# Patient Record
Sex: MALE | Race: BLACK OR AFRICAN AMERICAN | Employment: UNEMPLOYED | URBAN - METROPOLITAN AREA
[De-identification: names, ages, dates, MRNs, and addresses within clinical notes are randomized per-mention and may not be internally consistent; named-entity substitution may affect disease eponyms.]

---

## 2017-07-26 ENCOUNTER — HOSPITAL ENCOUNTER (EMERGENCY)
Age: 37
Discharge: HOME | End: 2017-07-26
Attending: EMERGENCY MEDICINE
Payer: COMMERCIAL

## 2017-07-26 ENCOUNTER — APPOINTMENT (OUTPATIENT)
Dept: GENERAL RADIOLOGY | Age: 37
End: 2017-07-26
Payer: COMMERCIAL

## 2017-07-26 ENCOUNTER — APPOINTMENT (OUTPATIENT)
Dept: CT IMAGING | Age: 37
End: 2017-07-26
Payer: COMMERCIAL

## 2017-07-26 DIAGNOSIS — R45.851 SUICIDAL INTENT: ICD-10-CM

## 2017-07-26 DIAGNOSIS — S11.91XA LACERATION OF NECK, INITIAL ENCOUNTER: Primary | ICD-10-CM

## 2017-07-26 LAB
ABO/RH: NORMAL
ANION GAP SERPL CALCULATED.3IONS-SCNC: 14 MMOL/L (ref 9–17)
ANTIBODY SCREEN: NEGATIVE
ARM BAND NUMBER: NORMAL
BLOOD BANK SPECIMEN: ABNORMAL
BUN BLDV-MCNC: 11 MG/DL (ref 6–20)
CHLORIDE BLD-SCNC: 101 MMOL/L (ref 98–107)
CO2: 26 MMOL/L (ref 20–31)
CREAT SERPL-MCNC: 0.77 MG/DL (ref 0.7–1.2)
ETHANOL PERCENT: <0.01 %
ETHANOL: <10 MG/DL
EXPIRATION DATE: NORMAL
GFR AFRICAN AMERICAN: >60 ML/MIN
GFR NON-AFRICAN AMERICAN: >60 ML/MIN
GFR SERPL CREATININE-BSD FRML MDRD: ABNORMAL ML/MIN/{1.73_M2}
GFR SERPL CREATININE-BSD FRML MDRD: ABNORMAL ML/MIN/{1.73_M2}
GLUCOSE BLD-MCNC: 93 MG/DL (ref 70–99)
HCT VFR BLD CALC: 41.3 % (ref 41–53)
HEMOGLOBIN: 13.9 G/DL (ref 13.5–17.5)
MCH RBC QN AUTO: 32.5 PG (ref 26–34)
MCHC RBC AUTO-ENTMCNC: 33.6 G/DL (ref 31–37)
MCV RBC AUTO: 96.9 FL (ref 80–100)
PDW BLD-RTO: 12.9 % (ref 12.5–15.4)
PLATELET # BLD: 169 K/UL (ref 140–450)
PMV BLD AUTO: 8.7 FL (ref 6–12)
POTASSIUM SERPL-SCNC: 4.4 MMOL/L (ref 3.7–5.3)
RBC # BLD: 4.27 M/UL (ref 4.5–5.9)
SODIUM BLD-SCNC: 141 MMOL/L (ref 135–144)
WBC # BLD: 5.1 K/UL (ref 3.5–11)

## 2017-07-26 PROCEDURE — 99291 CRITICAL CARE FIRST HOUR: CPT

## 2017-07-26 PROCEDURE — 82947 ASSAY GLUCOSE BLOOD QUANT: CPT

## 2017-07-26 PROCEDURE — 80051 ELECTROLYTE PANEL: CPT

## 2017-07-26 PROCEDURE — 85027 COMPLETE CBC AUTOMATED: CPT

## 2017-07-26 PROCEDURE — 86900 BLOOD TYPING SEROLOGIC ABO: CPT

## 2017-07-26 PROCEDURE — 70498 CT ANGIOGRAPHY NECK: CPT

## 2017-07-26 PROCEDURE — 86850 RBC ANTIBODY SCREEN: CPT

## 2017-07-26 PROCEDURE — G0480 DRUG TEST DEF 1-7 CLASSES: HCPCS

## 2017-07-26 PROCEDURE — G0390 TRAUMA RESPONS W/HOSP CRITI: HCPCS

## 2017-07-26 PROCEDURE — 86901 BLOOD TYPING SEROLOGIC RH(D): CPT

## 2017-07-26 PROCEDURE — 12001 RPR S/N/AX/GEN/TRNK 2.5CM/<: CPT

## 2017-07-26 PROCEDURE — 6360000004 HC RX CONTRAST MEDICATION: Performed by: SURGERY

## 2017-07-26 PROCEDURE — 82565 ASSAY OF CREATININE: CPT

## 2017-07-26 PROCEDURE — 6360000002 HC RX W HCPCS

## 2017-07-26 PROCEDURE — 70360 X-RAY EXAM OF NECK: CPT

## 2017-07-26 PROCEDURE — 84520 ASSAY OF UREA NITROGEN: CPT

## 2017-07-26 PROCEDURE — 99285 EMERGENCY DEPT VISIT HI MDM: CPT

## 2017-07-26 PROCEDURE — 90471 IMMUNIZATION ADMIN: CPT

## 2017-07-26 PROCEDURE — 2500000003 HC RX 250 WO HCPCS

## 2017-07-26 PROCEDURE — 90715 TDAP VACCINE 7 YRS/> IM: CPT

## 2017-07-26 RX ORDER — LIDOCAINE HYDROCHLORIDE AND EPINEPHRINE 10; 10 MG/ML; UG/ML
INJECTION, SOLUTION INFILTRATION; PERINEURAL
Status: COMPLETED
Start: 2017-07-26 | End: 2017-07-26

## 2017-07-26 RX ORDER — CEPHALEXIN 500 MG/1
500 CAPSULE ORAL 4 TIMES DAILY
Qty: 28 CAPSULE | Refills: 0 | Status: SHIPPED | OUTPATIENT
Start: 2017-07-26 | End: 2017-08-02

## 2017-07-26 RX ADMIN — IOVERSOL 90 ML: 741 INJECTION INTRA-ARTERIAL; INTRAVENOUS at 03:58

## 2017-07-26 RX ADMIN — TETANUS TOXOID, REDUCED DIPHTHERIA TOXOID AND ACELLULAR PERTUSSIS VACCINE, ADSORBED 0.5 ML: 5; 2.5; 8; 8; 2.5 SUSPENSION INTRAMUSCULAR at 04:18

## 2017-07-26 RX ADMIN — LIDOCAINE HYDROCHLORIDE,EPINEPHRINE BITARTRATE 20 ML: 10; .01 INJECTION, SOLUTION INFILTRATION; PERINEURAL at 04:17

## 2017-07-26 RX ADMIN — CEFAZOLIN SODIUM 2 G: 1 INJECTION, SOLUTION INTRAVENOUS at 03:16

## 2017-07-26 ASSESSMENT — ENCOUNTER SYMPTOMS
CHEST TIGHTNESS: 0
GASTROINTESTINAL NEGATIVE: 1
COUGH: 0
ABDOMINAL PAIN: 0
DIARRHEA: 0
RESPIRATORY NEGATIVE: 1
CONSTIPATION: 0
ALLERGIC/IMMUNOLOGIC NEGATIVE: 1
BACK PAIN: 0
NAUSEA: 0
EYES NEGATIVE: 1
WHEEZING: 0
SORE THROAT: 0
TROUBLE SWALLOWING: 0
VOMITING: 0
SHORTNESS OF BREATH: 0
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
## 2017-07-26 NOTE — PROCEDURES
Trauma, Emergency and Critical Surgical Services                    PROCEDURE NOTE - LACERATION CLOSURE    PATIENT NAME: Yan Zamudio RECORD NO. [id number]  DATE: 7/26/2017  SURGEON:   Krystle Machado  PRIMARY CARE PHYSICIAN: No primary care provider on file. PREOPERATIVE DIAGNOSIS: Laceration(s) as follows:   LOCATION: Left-sided neck   LENGTH: 3x1cm   LAYERED CLOSURE: No    POSTOPERATIVE DIAGNOSIS:  Same  PROCEDURE PERFORMED:  Suture closure of laceration  SURGEON:   Les Graham  ANESTHESIA:  Local utilizing  Lidocaine 1% without epinephrine  ESTIMATED BLOOD LOSS:  Less than 25 ml. COMPLICATIONS:  None immediately appreciated. OPERATIVE NOTE PREPARED BY: Fariba Paulino DO     DISCUSSION:  Caitlin Alfaro is a 31y.o.-year-old male. The history and physical examination were reviewed and confirmed. The diagnoses, proposed procedure, risks, possible complications, benefits and alternatives were discussed with the patient or family. He was given the opportunity to ask questions, and once answered, informed consent was obtained. The patient was then prepared for the procedure. PROCEDURE:  A timeout was initiated and the procedure and patient were confirmed by those present. The wound area was irrigated with sterile saline, cleansed with povidone iodine scrub and draped in a sterile fashion. The wound area was anesthetized with Lidocaine 1% without epinephrine without added sodium bicarbonate. The wound was repaired with 3-0 Nylon; 1 sutures were used. steri strips were use to approxite the wound edge    No immediate complication was evident. All sponge, instrument and needle counts were correct at the completion of the procedure.        Fariba Paulino DO  7/26/2017, 4:30 AM

## 2017-07-26 NOTE — H&P
available due to exam limitations documented above  Prior to Admission medications    Not on File       ALLERGIES:   []  None    []   Information not available due to exam limitations documented above   Review of patient's allergies indicates not on file. PAST MEDICAL HISTORY: []  None   []   Information not available due to exam limitations documented above    has no past medical history on file. has no past surgical history on file. FAMILY HISTORY   []   Information not available due to exam limitations documented above    family history is not on file. SOCIAL HISTORY  []   Information not available due to exam limitations documented above     has no tobacco history on file. has no alcohol history on file. has no drug history on file. PERTINENT SYSTEMIC REVIEW:  []   Information not available due to exam limitations documented above  Pertinent items are noted in HPI. PHYSICAL EXAMINATION:   GLASCOW COMA SCALE  NEUROMUSCULAR BLOCKADE PRIOR TO ARRIVAL     [x]No        []Yes      Variable  Score   Variable  Score  Eye opening [x]Spontaneous 4 Verbal  [x]Oriented  5     []To voice  3   []Confused  4    []To pain  2   []Inapp words  3    []None  1   []Incomp words 2       []None  1   Motor   [x]Obeys  6    []Localizes pain 5    []Withdraws(pain) 4    []Flexion(pain) 3  []Extension(pain) 2    []None  1     GCS Total = 15    PHYSICAL EXAMINATION  VITAL SIGNS: There were no vitals filed for this visit.     General Appearance: alert and oriented  in no acute distress  Skin: warm and dry, no rash or erythema  Head: normocephalic and atraumatic  Eyes: extraocular eye movements intact, conjunctivae normal  ENT:  external ear and ear canal normal bilaterally, nose without deformity,   Neck: 2x1 cm vertical incision to left neck, bleeding controlled, not penetrating through the platysma  Pulmonary/Chest: no acute respiratory distress, equal chest rise  Cardiovascular: normal rate, normal S1 and S2,  distal pulses intact,  Abdomen: soft, non-tender, non-distended,  Extremities: no cyanosis, clubbing or edema  Pelvis: stable  Musculoskeletal: moving all extremities no joint swelling, deformity or tenderness  Neurologic: grossly normal and speech normal      RADIOLOGY  PLAIN FILMS  Ordered Findings  []CHEST []Normal   [] Preliminary findings  []PELVIS  []Normal   [] Preliminary findings   EXTREMITIES      []RUE []Normal   _____________________    []LUE  []Normal   _____________________    []RLE []Normal   _____________________    []LLE  []Normal   _____________________  [x]OTHER []Normal   __neck soft tissue___________________    CT SCANS  Ordered  []HEAD  []Normal   [] Preliminary findings  []CHEST  []Normal   [] Preliminary findings   []ABD/PELVIS[]Normal  [] Preliminary findings  []FACE []Normal   [] Preliminary findings:   []C-SPINE  []Normal   [] Preliminary findings   []T-SPINE  []Normal   [] Preliminary findings   []L-SPINE  []Normal   [] Preliminary findings     [x]ANGIOGRAPHY  []Normal     [] Preliminary findings   []OTHER []Normal     [] Preliminary findings:     LABS  Labs Reviewed - No data to display        Mago BoyleDO  7/26/17, 3:43 AM

## 2019-08-09 ENCOUNTER — HOSPITAL ENCOUNTER (EMERGENCY)
Age: 39
Discharge: HOME | End: 2019-08-09
Attending: EMERGENCY MEDICINE

## 2019-08-09 VITALS
SYSTOLIC BLOOD PRESSURE: 124 MMHG | HEIGHT: 71 IN | DIASTOLIC BLOOD PRESSURE: 75 MMHG | TEMPERATURE: 98.4 F | RESPIRATION RATE: 15 BRPM | HEART RATE: 51 BPM | BODY MASS INDEX: 27.02 KG/M2 | OXYGEN SATURATION: 98 % | WEIGHT: 193 LBS

## 2019-08-09 DIAGNOSIS — T50.902A INTENTIONAL DRUG OVERDOSE, INITIAL ENCOUNTER (HCC): ICD-10-CM

## 2019-08-09 DIAGNOSIS — S11.91XA LACERATION OF NECK, INITIAL ENCOUNTER: Primary | ICD-10-CM

## 2019-08-09 PROCEDURE — 99282 EMERGENCY DEPT VISIT SF MDM: CPT

## 2019-08-09 PROCEDURE — 6370000000 HC RX 637 (ALT 250 FOR IP): Performed by: STUDENT IN AN ORGANIZED HEALTH CARE EDUCATION/TRAINING PROGRAM

## 2019-08-09 PROCEDURE — 12002 RPR S/N/AX/GEN/TRNK2.6-7.5CM: CPT

## 2019-08-09 RX ORDER — CEPHALEXIN 500 MG/1
500 CAPSULE ORAL 4 TIMES DAILY
Qty: 20 CAPSULE | Refills: 0 | Status: SHIPPED | OUTPATIENT
Start: 2019-08-09 | End: 2019-08-14

## 2019-08-09 RX ORDER — BACITRACIN, NEOMYCIN, POLYMYXIN B 400; 3.5; 5 [USP'U]/G; MG/G; [USP'U]/G
OINTMENT TOPICAL
Qty: 1 TUBE | Refills: 0 | Status: SHIPPED | OUTPATIENT
Start: 2019-08-09 | End: 2019-08-19

## 2019-08-09 RX ORDER — CEPHALEXIN 250 MG/1
500 CAPSULE ORAL ONCE
Status: COMPLETED | OUTPATIENT
Start: 2019-08-09 | End: 2019-08-09

## 2019-08-09 RX ADMIN — CEPHALEXIN 500 MG: 250 CAPSULE ORAL at 21:12

## 2019-08-09 NOTE — ED NOTES
Pt placed on cardiac monitor, BP cuff, and pulse ox. Alarms set.       Miah Rivas, LEODAN  08/09/19 8916
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
## 2019-08-10 ASSESSMENT — ENCOUNTER SYMPTOMS
CONSTIPATION: 0
SHORTNESS OF BREATH: 0
DIARRHEA: 0
COUGH: 0
ABDOMINAL PAIN: 0
RHINORRHEA: 0
VOMITING: 0
BACK PAIN: 0
NAUSEA: 0
TROUBLE SWALLOWING: 0
SORE THROAT: 0
STRIDOR: 0

## 2019-11-13 ENCOUNTER — HOSPITAL ENCOUNTER (EMERGENCY)
Age: 39
Discharge: HOME | End: 2019-11-13
Attending: EMERGENCY MEDICINE
Payer: COMMERCIAL

## 2019-11-13 ENCOUNTER — APPOINTMENT (OUTPATIENT)
Dept: GENERAL RADIOLOGY | Age: 39
End: 2019-11-13
Payer: COMMERCIAL

## 2019-11-13 VITALS
OXYGEN SATURATION: 100 % | WEIGHT: 180 LBS | RESPIRATION RATE: 18 BRPM | HEIGHT: 70 IN | BODY MASS INDEX: 25.77 KG/M2 | DIASTOLIC BLOOD PRESSURE: 67 MMHG | SYSTOLIC BLOOD PRESSURE: 107 MMHG | TEMPERATURE: 98.1 F | HEART RATE: 59 BPM

## 2019-11-13 DIAGNOSIS — S63.122A: Primary | ICD-10-CM

## 2019-11-13 PROCEDURE — 6370000000 HC RX 637 (ALT 250 FOR IP): Performed by: EMERGENCY MEDICINE

## 2019-11-13 PROCEDURE — 99283 EMERGENCY DEPT VISIT LOW MDM: CPT

## 2019-11-13 PROCEDURE — 73130 X-RAY EXAM OF HAND: CPT

## 2019-11-13 PROCEDURE — 96372 THER/PROPH/DIAG INJ SC/IM: CPT

## 2019-11-13 PROCEDURE — 6360000002 HC RX W HCPCS: Performed by: EMERGENCY MEDICINE

## 2019-11-13 PROCEDURE — 29130 APPL FINGER SPLINT STATIC: CPT

## 2019-11-13 RX ORDER — MORPHINE SULFATE 4 MG/ML
4 INJECTION, SOLUTION INTRAMUSCULAR; INTRAVENOUS ONCE
Status: COMPLETED | OUTPATIENT
Start: 2019-11-13 | End: 2019-11-13

## 2019-11-13 RX ORDER — IBUPROFEN 800 MG/1
800 TABLET ORAL ONCE
Status: COMPLETED | OUTPATIENT
Start: 2019-11-13 | End: 2019-11-13

## 2019-11-13 RX ORDER — ACETAMINOPHEN 325 MG/1
650 TABLET ORAL EVERY 6 HOURS PRN
Qty: 120 TABLET | Refills: 0 | Status: SHIPPED | OUTPATIENT
Start: 2019-11-13

## 2019-11-13 RX ORDER — IBUPROFEN 800 MG/1
800 TABLET ORAL EVERY 8 HOURS PRN
Qty: 30 TABLET | Refills: 0 | Status: SHIPPED | OUTPATIENT
Start: 2019-11-13

## 2019-11-13 RX ORDER — ACETAMINOPHEN 325 MG/1
650 TABLET ORAL ONCE
Status: COMPLETED | OUTPATIENT
Start: 2019-11-13 | End: 2019-11-13

## 2019-11-13 RX ADMIN — ACETAMINOPHEN 650 MG: 325 TABLET ORAL at 16:48

## 2019-11-13 RX ADMIN — MORPHINE SULFATE 4 MG: 4 INJECTION INTRAVENOUS at 15:34

## 2019-11-13 RX ADMIN — IBUPROFEN 800 MG: 800 TABLET, FILM COATED ORAL at 16:48

## 2019-11-13 ASSESSMENT — ENCOUNTER SYMPTOMS
TROUBLE SWALLOWING: 0
VOMITING: 0
BACK PAIN: 0
SHORTNESS OF BREATH: 0

## 2019-11-13 ASSESSMENT — PAIN DESCRIPTION - PROGRESSION: CLINICAL_PROGRESSION: NOT CHANGED

## 2019-11-13 ASSESSMENT — PAIN DESCRIPTION - FREQUENCY: FREQUENCY: CONTINUOUS

## 2019-11-13 ASSESSMENT — PAIN SCALES - GENERAL
PAINLEVEL_OUTOF10: 6
PAINLEVEL_OUTOF10: 7

## 2019-11-13 ASSESSMENT — PAIN DESCRIPTION - PAIN TYPE: TYPE: ACUTE PAIN

## 2019-11-13 ASSESSMENT — PAIN DESCRIPTION - LOCATION: LOCATION: FINGER (COMMENT WHICH ONE)

## 2019-11-13 ASSESSMENT — PAIN DESCRIPTION - ORIENTATION: ORIENTATION: LEFT

## 2021-04-10 ENCOUNTER — APPOINTMENT (OUTPATIENT)
Dept: CT IMAGING | Age: 41
End: 2021-04-10
Payer: COMMERCIAL

## 2021-04-10 ENCOUNTER — HOSPITAL ENCOUNTER (EMERGENCY)
Age: 41
Discharge: HOME | End: 2021-04-10
Attending: EMERGENCY MEDICINE
Payer: COMMERCIAL

## 2021-04-10 ENCOUNTER — APPOINTMENT (OUTPATIENT)
Dept: GENERAL RADIOLOGY | Age: 41
End: 2021-04-10
Payer: COMMERCIAL

## 2021-04-10 VITALS
BODY MASS INDEX: 26.48 KG/M2 | WEIGHT: 185 LBS | HEIGHT: 70 IN | TEMPERATURE: 98.1 F | DIASTOLIC BLOOD PRESSURE: 82 MMHG | RESPIRATION RATE: 16 BRPM | HEART RATE: 54 BPM | OXYGEN SATURATION: 98 % | SYSTOLIC BLOOD PRESSURE: 125 MMHG

## 2021-04-10 DIAGNOSIS — S11.91XA LACERATION OF NECK, INITIAL ENCOUNTER: ICD-10-CM

## 2021-04-10 DIAGNOSIS — T14.91XA SUICIDE ATTEMPT (HCC): Primary | ICD-10-CM

## 2021-04-10 DIAGNOSIS — U07.1 COVID-19: ICD-10-CM

## 2021-04-10 LAB
ALLEN TEST: ABNORMAL
ANION GAP SERPL CALCULATED.3IONS-SCNC: 11 MMOL/L (ref 9–17)
BLOOD BANK SPECIMEN: ABNORMAL
BLOOD BANK SPECIMEN: NORMAL
BUN BLDV-MCNC: 8 MG/DL (ref 6–20)
CARBOXYHEMOGLOBIN: ABNORMAL %
CHLORIDE BLD-SCNC: 105 MMOL/L (ref 98–107)
CO2: 24 MMOL/L (ref 20–31)
CREAT SERPL-MCNC: 0.67 MG/DL (ref 0.7–1.2)
ETHANOL PERCENT: <0.01 %
ETHANOL: <10 MG/DL
FIO2: ABNORMAL
GFR AFRICAN AMERICAN: >60 ML/MIN
GFR NON-AFRICAN AMERICAN: >60 ML/MIN
GFR SERPL CREATININE-BSD FRML MDRD: ABNORMAL ML/MIN/{1.73_M2}
GFR SERPL CREATININE-BSD FRML MDRD: ABNORMAL ML/MIN/{1.73_M2}
GLUCOSE BLD-MCNC: 83 MG/DL (ref 70–99)
HCG QUALITATIVE: ABNORMAL
HCO3 VENOUS: ABNORMAL MMOL/L (ref 24–30)
HCT VFR BLD CALC: 42.4 % (ref 40.7–50.3)
HEMOGLOBIN: 14.2 G/DL (ref 13–17)
INR BLD: 1.1
MCH RBC QN AUTO: 33.3 PG (ref 25.2–33.5)
MCHC RBC AUTO-ENTMCNC: 33.5 G/DL (ref 28.4–34.8)
MCV RBC AUTO: 99.5 FL (ref 82.6–102.9)
METHEMOGLOBIN: ABNORMAL %
MODE: ABNORMAL
NEGATIVE BASE EXCESS, VEN: ABNORMAL MMOL/L (ref 0–2)
NOTIFICATION TIME: ABNORMAL
NOTIFICATION: ABNORMAL
NRBC AUTOMATED: 0 PER 100 WBC
O2 DEVICE/FLOW/%: ABNORMAL
O2 SAT, VEN: ABNORMAL %
OXYHEMOGLOBIN: ABNORMAL % (ref 95–98)
PARTIAL THROMBOPLASTIN TIME: 23.8 SEC (ref 20.5–30.5)
PATIENT TEMP: ABNORMAL
PCO2, VEN, TEMP ADJ: ABNORMAL MMHG (ref 39–55)
PCO2, VEN: ABNORMAL (ref 39–55)
PDW BLD-RTO: 11.7 % (ref 11.8–14.4)
PEEP/CPAP: ABNORMAL
PH VENOUS: ABNORMAL (ref 7.32–7.42)
PH, VEN, TEMP ADJ: ABNORMAL (ref 7.32–7.42)
PLATELET # BLD: 178 K/UL (ref 138–453)
PMV BLD AUTO: 11 FL (ref 8.1–13.5)
PO2, VEN, TEMP ADJ: ABNORMAL MMHG (ref 30–50)
PO2, VEN: ABNORMAL (ref 30–50)
POSITIVE BASE EXCESS, VEN: ABNORMAL MMOL/L (ref 0–2)
POTASSIUM SERPL-SCNC: 4.5 MMOL/L (ref 3.7–5.3)
PROTHROMBIN TIME: 11.9 SEC (ref 9.1–12.3)
PSV: ABNORMAL
PT. POSITION: ABNORMAL
RBC # BLD: 4.26 M/UL (ref 4.21–5.77)
RESPIRATORY RATE: ABNORMAL
SAMPLE SITE: ABNORMAL
SARS-COV-2, RAPID: DETECTED
SET RATE: ABNORMAL
SODIUM BLD-SCNC: 140 MMOL/L (ref 135–144)
SPECIMEN DESCRIPTION: ABNORMAL
TEXT FOR RESPIRATORY: ABNORMAL
TOTAL HB: ABNORMAL G/DL (ref 12–16)
TOTAL RATE: ABNORMAL
VT: ABNORMAL
WBC # BLD: 3.7 K/UL (ref 3.5–11.3)

## 2021-04-10 PROCEDURE — 82805 BLOOD GASES W/O2 SATURATION: CPT

## 2021-04-10 PROCEDURE — 12042 INTMD RPR N-HF/GENIT2.6-7.5: CPT

## 2021-04-10 PROCEDURE — 85610 PROTHROMBIN TIME: CPT

## 2021-04-10 PROCEDURE — 70498 CT ANGIOGRAPHY NECK: CPT

## 2021-04-10 PROCEDURE — 82947 ASSAY GLUCOSE BLOOD QUANT: CPT

## 2021-04-10 PROCEDURE — 71045 X-RAY EXAM CHEST 1 VIEW: CPT

## 2021-04-10 PROCEDURE — G0480 DRUG TEST DEF 1-7 CLASSES: HCPCS

## 2021-04-10 PROCEDURE — 6360000004 HC RX CONTRAST MEDICATION: Performed by: EMERGENCY MEDICINE

## 2021-04-10 PROCEDURE — 84703 CHORIONIC GONADOTROPIN ASSAY: CPT

## 2021-04-10 PROCEDURE — 85730 THROMBOPLASTIN TIME PARTIAL: CPT

## 2021-04-10 PROCEDURE — 87635 SARS-COV-2 COVID-19 AMP PRB: CPT

## 2021-04-10 PROCEDURE — 84520 ASSAY OF UREA NITROGEN: CPT

## 2021-04-10 PROCEDURE — 80051 ELECTROLYTE PANEL: CPT

## 2021-04-10 PROCEDURE — 85027 COMPLETE CBC AUTOMATED: CPT

## 2021-04-10 PROCEDURE — 99282 EMERGENCY DEPT VISIT SF MDM: CPT

## 2021-04-10 PROCEDURE — 82565 ASSAY OF CREATININE: CPT

## 2021-04-10 RX ORDER — LIDOCAINE HYDROCHLORIDE 10 MG/ML
INJECTION, SOLUTION INFILTRATION; PERINEURAL
Status: DISCONTINUED
Start: 2021-04-10 | End: 2021-04-10 | Stop reason: HOSPADM

## 2021-04-10 RX ORDER — LIDOCAINE HYDROCHLORIDE 10 MG/ML
20 INJECTION, SOLUTION INFILTRATION; PERINEURAL ONCE
Status: DISCONTINUED | OUTPATIENT
Start: 2021-04-10 | End: 2021-04-10 | Stop reason: HOSPADM

## 2021-04-10 RX ADMIN — IOPAMIDOL 90 ML: 755 INJECTION, SOLUTION INTRAVENOUS at 06:28

## 2021-04-10 ASSESSMENT — PAIN SCALES - GENERAL: PAINLEVEL_OUTOF10: 6

## 2021-04-10 ASSESSMENT — PAIN DESCRIPTION - PAIN TYPE: TYPE: ACUTE PAIN

## 2021-04-10 ASSESSMENT — ENCOUNTER SYMPTOMS
NAUSEA: 0
VOMITING: 0
COUGH: 0
ABDOMINAL PAIN: 0
SHORTNESS OF BREATH: 0

## 2021-04-10 ASSESSMENT — PAIN DESCRIPTION - FREQUENCY: FREQUENCY: CONTINUOUS

## 2021-04-10 ASSESSMENT — PAIN DESCRIPTION - ORIENTATION: ORIENTATION: LEFT

## 2021-04-10 NOTE — PROCEDURES
PROCEDURE NOTE - LACERATION CLOSURE    PATIENT NAME: Yan Zamudio RECORD NO. [id number]  DATE: 4/10/2021  TIME OF CLOSURE: 6:50am  SURGEON: Robby  Laceration Repair Performed by: Shreyas Youssef MD    PRIMARY CARE PHYSICIAN: Keit Laureano MD    PREOPERATIVE DIAGNOSIS: Laceration(s) as follows:   LOCATION: Left anterior neck   LENGTH: 4cm   LAYERED CLOSURE: Yes    POSTOPERATIVE DIAGNOSIS:  Same  PROCEDURE PERFORMED:  Suture closure of laceration  SURGEON: Shreyas Youssef, PGY-1  ESTIMATED BLOOD LOSS:  2ml  COMPLICATIONS:  None immediately appreciated. OPERATIVE NOTE PREPARED BY: Shreyas Youssef MD     DISCUSSION:  Beatriz Sandoval is a 47y.o.-year-old male. The history and physical examination were reviewed and confirmed. The diagnoses, proposed procedure, risks, possible complications, benefits and alternatives were discussed with the patient or family. He was given the opportunity to ask questions, and once answered, informed consent was obtained. The patient was then prepared for the procedure. Consent: The patient provided verbal consent for this procedure. Time out performed: Immediately prior to the procedure a \"time out\" was called to verify the correct patient, the correct procedure, equipment, support staff and site/side marked as required. Procedure: The patient was placed in the appropriate position and anesthesia around the laceration was obtained by infiltration using 1% Lidocaine without epinephrine. The area was then cleansed with betadine and draped in a sterile fashion. The laceration was closed in two layers. The muscle layer was closed with 3-0 Vicryl x1 using an interrupted suture. The skin was closed with 4-0 Ethilon x5 using interrupted sutures. There were no additional lacerations requiring repair. The wound area was then dressed with bacitracin, gauze and tape. Total repaired wound length: 4 cm.      Other Items: Suture count: 2    The patient tolerated the procedure
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
## 2021-04-10 NOTE — ED PROVIDER NOTES
[facility] [address] ED  Emergency Department  Faculty Attestation       I performed a history and physical examination of the patient and discussed management with the resident. I reviewed the residents note and agree with the documented findings including all diagnostic interpretations and plan of care. Any areas of disagreement are noted on the chart. I was personally present for the key portions of any procedures. I have documented in the chart those procedures where I was not present during the key portions. I have reviewed the emergency nurses triage note. I agree with the chief complaint, past medical history, past surgical history, allergies, medications, social and family history as documented unless otherwise noted below. Documentation of the HPI, Physical Exam and Medical Decision Making performed by scriblynda is based on my personal performance of the HPI, PE and MDM. For Physician Assistant/ Nurse Practitioner cases/documentation I have personally evaluated this patient and have completed at least one if not all key elements of the E/M (history, physical exam, and MDM). Additional findings are as noted. Pertinent Comments     Primary Care Physician: Geronimo Chinchilla MD    ED Triage Vitals [04/10/21 0558]   BP Temp Temp Source Pulse Resp SpO2 Height Weight   125/82 98.1 °F (36.7 °C) Oral 54 16 98 % 5' 10\" (1.778 m) 185 lb (83.9 kg)        History/Physical: This is a 47 y.o. male who presents to the Emergency Department with complaint of laceration to the neck. Self inflicted. Reportedly enlargement of bleeding initially stopped by the alf nurse. On exam, patient sitting up in bed in no acute distress. Normocephalic. Neck with a laceration on the left side, it does violate through platysma, but no bubbling from the area, and no expanding hematoma. Controlling airway. Heart rate is bradycardic, but regular lungs clear to auscultation. Abdomen is soft and nontender.   He is alert and oriented. SI     MDM/Plan:   Self-inflicted penetrating trauma to the left side of the neck, does violate platysma and therefore was upgraded to a trauma alert, imaging per trauma team.  However of note, does not currently have an expanding hematoma and no compromise of the airway at this time. CRITICAL CARE: There was significant risk of life threatening deterioration of patient's condition requiring my direct management. Critical care time 15 minutes, excluding any documented procedures.      Lesli Kim MD  Attending Emergency Physician         Lesli Kim MD  04/10/21 9646

## 2021-04-10 NOTE — ED PROVIDER NOTES
Lifestyle    Physical activity     Days per week: Not on file     Minutes per session: Not on file    Stress: Not on file   Relationships    Social connections     Talks on phone: Not on file     Gets together: Not on file     Attends [employer] service: Not on file     Active member of club or organization: Not on file     Attends meetings of clubs or organizations: Not on file     Relationship status: Not on file    Intimate partner violence     Fear of current or ex partner: Not on file     Emotionally abused: Not on file     Physically abused: Not on file     Forced sexual activity: Not on file   Other Topics Concern    Not on file   Social History Narrative    ** Merged History Encounter **            Family History   Problem Relation Age of Onset    Cancer Mother     Diabetes Father     Diabetes Sister         Allergies:  Patient has no known allergies. Home Medications:  Prior to Admission medications    Medication Sig Start Date End Date Taking? Authorizing Provider   ibuprofen (ADVIL;MOTRIN) 800 MG tablet Take 1 tablet by mouth every 8 hours as needed for Pain 11/13/19   Dinesh Dan MD   acetaminophen (TYLENOL) 325 MG tablet Take 2 tablets by mouth every 6 hours as needed for Pain 11/13/19   Dinesh Dan MD   Ranitidine HCl (ZANTAC PO) Take 150 mg by mouth daily. Historical Provider, MD   aspirin 81 MG EC tablet Take 81 mg by mouth daily. Historical Provider, MD   propranolol (INDERAL) 20 MG tablet Take 20 mg by mouth 2 times daily. Historical Provider, MD       REVIEW OFSYSTEMS    (2-9 systems for level 4, 10 or more for level 5)      Review of Systems   Constitutional: Negative for chills and fever. Respiratory: Negative for cough and shortness of breath. Cardiovascular: Negative for chest pain. Gastrointestinal: Negative for abdominal pain, nausea and vomiting. Musculoskeletal: Positive for neck pain. Skin: Positive for wound.         Positive for laceration to the left anterior neck. Neurological: Negative for syncope, weakness, light-headedness and numbness. Hematological: Does not bruise/bleed easily. PHYSICAL EXAM   (up to 7 for level 4, 8 or more forlevel 5)      INITIAL VITALS:   ED Triage Vitals [04/10/21 0558]   BP Temp Temp Source Pulse Resp SpO2 Height Weight   125/82 98.1 °F (36.7 °C) Oral 54 16 98 % 5' 10\" (1.778 m) 185 lb (83.9 kg)       Physical Exam  Vitals signs and nursing note reviewed. Constitutional:       General: He is not in acute distress. Appearance: Normal appearance. He is not ill-appearing, toxic-appearing or diaphoretic. HENT:      Head: Normocephalic and atraumatic. Neck:      Comments: 2.5 cm laceration/puncture wound to the left anterior neck over zone 2. Wound was probed and does penetrate the platysma. No expanding hematoma or active bleeding at this time. Cardiovascular:      Rate and Rhythm: Normal rate and regular rhythm. Pulmonary:      Effort: Pulmonary effort is normal.      Breath sounds: Normal breath sounds. Abdominal:      General: There is no distension. Palpations: Abdomen is soft. Tenderness: There is no abdominal tenderness. There is no guarding. Skin:     General: Skin is warm and dry. Neurological:      General: No focal deficit present. Mental Status: He is alert and oriented to person, place, and time. Comments: Bilateral upper extremities are neurovascularly intact.          DIFFERENTIAL  DIAGNOSIS     PLAN (LABS / IMAGING / EKG):  Orders Placed This Encounter   Procedures    COVID-19, Rapid    XR CHEST PORTABLE    CTA NECK W CONTRAST    Urine Drug Screen    COVID-19    Trauma Panel    PREVIOUS SPECIMEN    Blood Gas, Venous    Inpatient consult to Social Work    Blood Bank Specimen       MEDICATIONS ORDERED:  Orders Placed This Encounter   Medications    Tetanus-Diphth-Acell Pertussis ([address]) 5-2.5-18.5 LF-MCG/0.5 injection     MATTHEW HARP: cabinet override  iopamidol (ISOVUE-370) 76 % injection 90 mL    lidocaine 1 % injection 20 mL    lidocaine 1 % injection     Lazara Reach: cabinet override       DDX: Suicide attempt, zone 2 neck structure injury, superficial injury    Initial MDM/Plan/ED course: 47 y.o. male who presents with self-inflicted laceration to the left neck. Patient initially placed in normal ER room, on exam vitals normal patient is in no acute distress however there is a penetrating wound/laceration to the left anterior neck over the zone 2 region that does appear to penetrate the platysma. Patient was upgraded to a trauma alert and taken to the trauma bay. At that time primary survey was normal with airway intact, normal bilateral breath sounds, 2+ pulses to all extremities, GCS of 15 pupils 2 equal and reactive, on secondary survey there is no other injuries that can be found besides the 2.5 cm laceration/puncture wound to the left anterior neck representing to. Chest x-ray and CTA of the neck obtained as well as lab work. Chest x-ray normal.  CT scan showing no evidence of vascular injury from wound. Trauma team did suture up the laceration. Patient was also found to be Covid positive. Patient was transferred back to residential and will plan on transfer to [facility] from there due to suicide attempt. Patient instructed to have sutures taken out in 1 week and he was given general information on COVID-19. Patient was hemodynamically normal and as far as Covid is concerned was completely asymptomatic.     DIAGNOSTIC RESULTS / EMERGENCY DEPARTMENT COURSE / MDM     LABS:  Labs Reviewed   COVID-19, RAPID - Abnormal; Notable for the following components:       Result Value    SARS-CoV-2, Rapid DETECTED (*)     All other components within normal limits   TRAUMA PANEL - Abnormal; Notable for the following components:    RDW 11.7 (*)     CREATININE 0.67 (*)     All other components within normal limits   URINE DRUG SCREEN   COVID-19   PREVIOUS SPECIMEN

## 2024-09-04 ENCOUNTER — HOSPITAL ENCOUNTER (EMERGENCY)
Age: 44
Discharge: HOME | End: 2024-09-04
Attending: EMERGENCY MEDICINE
Payer: COMMERCIAL

## 2024-09-04 VITALS
SYSTOLIC BLOOD PRESSURE: 124 MMHG | OXYGEN SATURATION: 98 % | WEIGHT: 180 LBS | HEIGHT: 71 IN | DIASTOLIC BLOOD PRESSURE: 75 MMHG | BODY MASS INDEX: 25.2 KG/M2 | TEMPERATURE: 98.2 F | RESPIRATION RATE: 18 BRPM | HEART RATE: 55 BPM

## 2024-09-04 DIAGNOSIS — S11.91XA LACERATION OF NECK, INITIAL ENCOUNTER: ICD-10-CM

## 2024-09-04 DIAGNOSIS — R45.851 SUICIDAL IDEATION: Primary | ICD-10-CM

## 2024-09-04 PROCEDURE — 12002 RPR S/N/AX/GEN/TRNK2.6-7.5CM: CPT

## 2024-09-04 PROCEDURE — 2500000003 HC RX 250 WO HCPCS

## 2024-09-04 PROCEDURE — 99283 EMERGENCY DEPT VISIT LOW MDM: CPT

## 2024-09-04 RX ORDER — CEPHALEXIN 500 MG/1
500 CAPSULE ORAL 4 TIMES DAILY
Qty: 28 CAPSULE | Refills: 0 | Status: SHIPPED | OUTPATIENT
Start: 2024-09-04 | End: 2024-09-11

## 2024-09-04 RX ORDER — LIDOCAINE HYDROCHLORIDE 10 MG/ML
5 INJECTION, SOLUTION INFILTRATION; PERINEURAL ONCE
Status: COMPLETED | OUTPATIENT
Start: 2024-09-04 | End: 2024-09-04

## 2024-09-04 RX ADMIN — LIDOCAINE HYDROCHLORIDE 5 ML: 10 INJECTION, SOLUTION INFILTRATION; PERINEURAL at 12:36

## 2024-09-04 NOTE — ED PROVIDER NOTES
[facility] EMERGENCY DEPARTMENT  EMERGENCY DEPARTMENT ENCOUNTER        Pt Name: Carlito Marcelino  MRN: [id number]  Birthdate 1980  Date of evaluation: 9/4/2024  Provider: Lopez Quevedo DO  PCP: Riki Conroy DO  Note Started: 12:16 PM EDT 9/4/24    CHIEF COMPLAINT       No chief complaint on file.      HISTORY OF PRESENT ILLNESS: 1 or more Elements   History From: PATIENT     Limitations to history : None    Carlito Marcelino is a 43 y.o. male arriving from prison with a complaint of suicidal ideation and a self-inflicted wound to the left neck via razor blade.  Patient has the only complaint of mild pain overlying the laceration.  His tetanus is up to date.  He is cooperative and denies any difficulty breathing, difficulty swallowing.      Nursing Notes were all reviewed and agreed with or any disagreements were addressed in the HPI.    REVIEW OF SYSTEMS :      Review of Systems    POSITIVE (+): laceration  NEGATIVE (-): fevers, chills, nausea, vomiting, diarrhea, constipation, shortness of breath, chest pain, abdominal pain      SURGICAL HISTORY   No past surgical history on file.    CURRENTMEDICATIONS       Previous Medications    ACETAMINOPHEN (TYLENOL) 325 MG TABLET    Take 2 tablets by mouth every 6 hours as needed for Pain    ASPIRIN 81 MG EC TABLET    Take 81 mg by mouth daily.    IBUPROFEN (ADVIL;MOTRIN) 800 MG TABLET    Take 1 tablet by mouth every 8 hours as needed for Pain    PROPRANOLOL (INDERAL) 20 MG TABLET    Take 20 mg by mouth 2 times daily.    RANITIDINE HCL (ZANTAC PO)    Take 150 mg by mouth daily.       ALLERGIES     Patient has no known allergies.    FAMILYHISTORY       Family History   Problem Relation Age of Onset    Cancer Mother     Diabetes Father     Diabetes Sister         SOCIAL HISTORY       Social History     Tobacco Use    Smoking status: Former    Smokeless tobacco: Never   Substance Use Topics    Alcohol use: No    Drug use: No       SCREENINGS

## 2024-12-03 ENCOUNTER — HOSPITAL ENCOUNTER (EMERGENCY)
Age: 44
Discharge: HOME | End: 2024-12-03
Attending: EMERGENCY MEDICINE

## 2024-12-03 ENCOUNTER — APPOINTMENT (OUTPATIENT)
Dept: GENERAL RADIOLOGY | Age: 44
End: 2024-12-03

## 2024-12-03 VITALS
SYSTOLIC BLOOD PRESSURE: 111 MMHG | HEART RATE: 74 BPM | DIASTOLIC BLOOD PRESSURE: 94 MMHG | WEIGHT: 180 LBS | HEIGHT: 70 IN | TEMPERATURE: 98 F | OXYGEN SATURATION: 98 % | RESPIRATION RATE: 18 BRPM | BODY MASS INDEX: 25.77 KG/M2

## 2024-12-03 DIAGNOSIS — S11.91XA LACERATION OF NECK, INITIAL ENCOUNTER: Primary | ICD-10-CM

## 2024-12-03 PROCEDURE — 70360 X-RAY EXAM OF NECK: CPT

## 2024-12-03 PROCEDURE — 12002 RPR S/N/AX/GEN/TRNK2.6-7.5CM: CPT

## 2024-12-03 PROCEDURE — 99283 EMERGENCY DEPT VISIT LOW MDM: CPT

## 2024-12-03 RX ORDER — CEPHALEXIN 500 MG/1
500 CAPSULE ORAL 4 TIMES DAILY
Qty: 28 CAPSULE | Refills: 0 | Status: SHIPPED | OUTPATIENT
Start: 2024-12-03 | End: 2024-12-10

## 2024-12-03 NOTE — ED PROVIDER NOTES
[facility] EMERGENCY DEPARTMENT  EMERGENCY DEPARTMENT ENCOUNTER        Pt Name: Carlito Marcelino  MRN: [id number]  Birthdate 1980  Date of evaluation: 12/3/2024  Provider: Fareed Kuo II, DO  PCP: Riki Conroy DO  Note Started: 9:25 AM EST 12/3/24    CHIEF COMPLAINT       Chief Complaint   Patient presents with    Laceration     Pt complaint of laceration to his neck.       HISTORY OF PRESENT ILLNESS: 1 or more Elements            Carlito Marcelino is a 44 y.o. male who brought in by corrections officers for laceration to his left neck. Pt reports a elf inflicted wound to the left neck with  razor blade.  Patient states his last tetanus is up-to-date.  Patient states that he was depressed about the [place] is losing.  Patient states that he has mental health resources at the facility he is at.  Bleeding was controlled prior to arrival, no pulsatile bleeding.      Nursing Notes were all reviewed and agreed with or any disagreements were addressed in the HPI.      REVIEW OF EXTERNAL NOTE :       Records reviewed for medical hx, surgical, hx, and medication lists      Chart Review/External Note Review    Last Echo reviewed by Me:  No results found for: \"LVEF\", \"LVEFMODE\"          Controlled Substance Monitoring:    Acute and Chronic Pain Monitoring:        No data to display                    REVIEW OF SYSTEMS :      Positives and Pertinent negatives as per HPI.     SURGICAL HISTORY   No past surgical history on file.    CURRENTMEDICATIONS       Previous Medications    ACETAMINOPHEN (TYLENOL) 325 MG TABLET    Take 2 tablets by mouth every 6 hours as needed for Pain    ASPIRIN 81 MG EC TABLET    Take 81 mg by mouth daily.    IBUPROFEN (ADVIL;MOTRIN) 800 MG TABLET    Take 1 tablet by mouth every 8 hours as needed for Pain    PROPRANOLOL (INDERAL) 20 MG TABLET    Take 20 mg by mouth 2 times daily.    RANITIDINE HCL (ZANTAC PO)    Take 150 mg by mouth daily.       ALLERGIES

## 2024-12-03 NOTE — DISCHARGE INSTRUCTIONS
Stitches are absorbable.   Please take medication as prescribed.  Please follow-up with your primary care doctor for further evaluation of your symptoms and for ER follow-up.  Please return to the ER for any new or worsening symptoms.